# Patient Record
Sex: MALE | Race: BLACK OR AFRICAN AMERICAN | Employment: FULL TIME | URBAN - METROPOLITAN AREA
[De-identification: names, ages, dates, MRNs, and addresses within clinical notes are randomized per-mention and may not be internally consistent; named-entity substitution may affect disease eponyms.]

---

## 2023-08-09 ENCOUNTER — HOSPITAL ENCOUNTER (EMERGENCY)
Age: 28
Discharge: HOME | End: 2023-08-09
Payer: OTHER GOVERNMENT

## 2023-08-09 ENCOUNTER — APPOINTMENT (OUTPATIENT)
Dept: GENERAL RADIOLOGY | Age: 28
End: 2023-08-09
Payer: OTHER GOVERNMENT

## 2023-08-09 VITALS
HEIGHT: 67 IN | RESPIRATION RATE: 18 BRPM | HEART RATE: 61 BPM | SYSTOLIC BLOOD PRESSURE: 126 MMHG | OXYGEN SATURATION: 99 % | TEMPERATURE: 98.7 F | BODY MASS INDEX: 29.03 KG/M2 | WEIGHT: 185 LBS | DIASTOLIC BLOOD PRESSURE: 93 MMHG

## 2023-08-09 DIAGNOSIS — R20.2 PARESTHESIA OF LEFT UPPER AND LOWER EXTREMITY: ICD-10-CM

## 2023-08-09 DIAGNOSIS — R07.89 CHEST DISCOMFORT: Primary | ICD-10-CM

## 2023-08-09 LAB
ALBUMIN SERPL-MCNC: 4.4 G/DL (ref 3.4–5)
ALBUMIN/GLOB SERPL: 1.2 {RATIO} (ref 1.1–2.2)
ALP SERPL-CCNC: 93 U/L (ref 40–129)
ALT SERPL-CCNC: 33 U/L (ref 10–40)
ANION GAP SERPL CALCULATED.3IONS-SCNC: 7 MMOL/L (ref 3–16)
AST SERPL-CCNC: 30 U/L (ref 15–37)
BASOPHILS # BLD: 0 K/UL (ref 0–0.2)
BASOPHILS NFR BLD: 0.3 %
BILIRUB SERPL-MCNC: 0.3 MG/DL (ref 0–1)
BUN SERPL-MCNC: 12 MG/DL (ref 7–20)
CALCIUM SERPL-MCNC: 9.7 MG/DL (ref 8.3–10.6)
CHLORIDE SERPL-SCNC: 100 MMOL/L (ref 99–110)
CO2 SERPL-SCNC: 28 MMOL/L (ref 21–32)
CREAT SERPL-MCNC: 1.2 MG/DL (ref 0.9–1.3)
DEPRECATED RDW RBC AUTO: 12.8 % (ref 12.4–15.4)
EKG ATRIAL RATE: 63 BPM
EKG DIAGNOSIS: NORMAL
EKG P AXIS: 72 DEGREES
EKG P-R INTERVAL: 174 MS
EKG Q-T INTERVAL: 374 MS
EKG QRS DURATION: 76 MS
EKG QTC CALCULATION (BAZETT): 382 MS
EKG R AXIS: 36 DEGREES
EKG T AXIS: 30 DEGREES
EKG VENTRICULAR RATE: 63 BPM
EOSINOPHIL # BLD: 0 K/UL (ref 0–0.6)
EOSINOPHIL NFR BLD: 1.1 %
GFR SERPLBLD CREATININE-BSD FMLA CKD-EPI: >60 ML/MIN/{1.73_M2}
GLUCOSE SERPL-MCNC: 109 MG/DL (ref 70–99)
HCT VFR BLD AUTO: 43.7 % (ref 40.5–52.5)
HGB BLD-MCNC: 14.5 G/DL (ref 13.5–17.5)
LYMPHOCYTES # BLD: 2 K/UL (ref 1–5.1)
LYMPHOCYTES NFR BLD: 52.1 %
MCH RBC QN AUTO: 27.4 PG (ref 26–34)
MCHC RBC AUTO-ENTMCNC: 33.3 G/DL (ref 31–36)
MCV RBC AUTO: 82.3 FL (ref 80–100)
MONOCYTES # BLD: 0.4 K/UL (ref 0–1.3)
MONOCYTES NFR BLD: 11.6 %
NEUTROPHILS # BLD: 1.3 K/UL (ref 1.7–7.7)
NEUTROPHILS NFR BLD: 34.9 %
PLATELET # BLD AUTO: 171 K/UL (ref 135–450)
PMV BLD AUTO: 8.8 FL (ref 5–10.5)
POTASSIUM SERPL-SCNC: 4.1 MMOL/L (ref 3.5–5.1)
PROT SERPL-MCNC: 8.1 G/DL (ref 6.4–8.2)
RBC # BLD AUTO: 5.31 M/UL (ref 4.2–5.9)
SODIUM SERPL-SCNC: 135 MMOL/L (ref 136–145)
SPECIMEN STATUS: NORMAL
TROPONIN, HIGH SENSITIVITY: <6 NG/L (ref 0–22)
TROPONIN, HIGH SENSITIVITY: <6 NG/L (ref 0–22)
WBC # BLD AUTO: 3.8 K/UL (ref 4–11)

## 2023-08-09 PROCEDURE — 71045 X-RAY EXAM CHEST 1 VIEW: CPT

## 2023-08-09 PROCEDURE — 99285 EMERGENCY DEPT VISIT HI MDM: CPT

## 2023-08-09 PROCEDURE — 80053 COMPREHEN METABOLIC PANEL: CPT

## 2023-08-09 PROCEDURE — 93005 ELECTROCARDIOGRAM TRACING: CPT | Performed by: EMERGENCY MEDICINE

## 2023-08-09 PROCEDURE — 84484 ASSAY OF TROPONIN QUANT: CPT

## 2023-08-09 PROCEDURE — 85025 COMPLETE CBC W/AUTO DIFF WBC: CPT

## 2023-08-09 PROCEDURE — 93010 ELECTROCARDIOGRAM REPORT: CPT | Performed by: INTERNAL MEDICINE

## 2023-08-09 ASSESSMENT — HEART SCORE: ECG: 0

## 2023-08-09 ASSESSMENT — PAIN DESCRIPTION - PAIN TYPE: TYPE: ACUTE PAIN

## 2023-08-09 ASSESSMENT — PAIN DESCRIPTION - ONSET: ONSET: GRADUAL

## 2023-08-09 ASSESSMENT — PAIN SCALES - GENERAL: PAINLEVEL_OUTOF10: 5

## 2023-08-09 ASSESSMENT — PAIN - FUNCTIONAL ASSESSMENT: PAIN_FUNCTIONAL_ASSESSMENT: 0-10

## 2023-08-09 ASSESSMENT — PAIN DESCRIPTION - LOCATION: LOCATION: CHEST

## 2023-08-09 ASSESSMENT — PAIN DESCRIPTION - FREQUENCY: FREQUENCY: CONTINUOUS

## 2023-08-09 NOTE — ED PROVIDER NOTES
[facility]  ED  EMERGENCY DEPARTMENT ENCOUNTER        Pt Name: Tabatha Fernandes  MRN: [id number]  [address] 1995  Date of evaluation: 8/9/2023  Provider: David Adkins PA-C  PCP: No primary care provider on file. ED Attending: Ida AUGUSTE. I have evaluated this patient. CHIEF COMPLAINT:     Chief Complaint   Patient presents with    Chest Pain     Pt states chest pain that started last night. Pt states \"I think maybe it was a stroke\"       HISTORY OF PRESENT ILLNESS:      History provided by the patient. No limitations. Tabatha Fernandes is a 25 y.o. male who arrives to the ED by private vehicle. This patient arrives to the emergency department describing chest pain that been going on intermittently for about 3 weeks. He states even prior to experiencing the episodes of chest pain he was experiencing some discomfort to his left neck/upper back and of his left arm. He reports an intermittent feeling of numbness/tingling to his left side. These episodes never last more than a couple of minutes before he is able to \"shake them off\". He states he started bodybuilding 3 to 4 months ago and seems to correlate the onset of his symptoms with when he really started to get into bodybuilding. Some specific episodes-- he had an episode about a week ago while he was driving where he had substernal chest pain and reports the left side of his body also felt painful and tingly. He was able to Rachelfort it off\" and returned to normal pretty quickly. He states last night while he was running around in his home watching TV he again started having substernal chest pain with radiation to the left arm and tingling in his left arm. He tells the triage nurse that he thinks it something neurologic, he \"thinks it might be a stroke\". Upon arrival, he still has some mild chest discomfort. Denies any feeling of weakness or numbness anywhere. Denies any personal past medical history.   Denies any

## 2023-08-09 NOTE — ED NOTES
Pt discharged with discharge paperwork. Pt verbalized understanding of education. Pt aware of new doctor shannon. Pt informed to return to ED for worsening symptoms. Pt ambulated out of ED with no signs of distress. No additional needs or concerns.        Carolyn Herrera RN  08/09/23 1087

## 2023-08-13 SDOH — HEALTH STABILITY: PHYSICAL HEALTH: ON AVERAGE, HOW MANY MINUTES DO YOU ENGAGE IN EXERCISE AT THIS LEVEL?: 40 MIN

## 2023-08-13 ASSESSMENT — SOCIAL DETERMINANTS OF HEALTH (SDOH)

## 2023-08-14 ENCOUNTER — OFFICE VISIT (OUTPATIENT)
Dept: PRIMARY CARE CLINIC | Age: 28
End: 2023-08-14

## 2023-08-14 VITALS
TEMPERATURE: 97.4 F | OXYGEN SATURATION: 97 % | DIASTOLIC BLOOD PRESSURE: 78 MMHG | RESPIRATION RATE: 18 BRPM | WEIGHT: 198.6 LBS | SYSTOLIC BLOOD PRESSURE: 138 MMHG | HEIGHT: 67 IN | BODY MASS INDEX: 31.17 KG/M2 | HEART RATE: 81 BPM

## 2023-08-14 DIAGNOSIS — R20.2 ARM PARESTHESIA, LEFT: ICD-10-CM

## 2023-08-14 DIAGNOSIS — Z00.00 HEALTHCARE MAINTENANCE: ICD-10-CM

## 2023-08-14 DIAGNOSIS — R07.9 CHEST PAIN, UNSPECIFIED TYPE: Primary | ICD-10-CM

## 2023-08-14 DIAGNOSIS — E66.9 CLASS 1 OBESITY WITHOUT SERIOUS COMORBIDITY WITH BODY MASS INDEX (BMI) OF 31.0 TO 31.9 IN ADULT, UNSPECIFIED OBESITY TYPE: ICD-10-CM

## 2023-08-14 DIAGNOSIS — M54.2 NECK PAIN: ICD-10-CM

## 2023-08-14 DIAGNOSIS — R22.1 NECK MASS: ICD-10-CM

## 2023-08-14 RX ORDER — MELOXICAM 7.5 MG/1
7.5 TABLET ORAL DAILY
Qty: 30 TABLET | Refills: 3 | Status: SHIPPED | OUTPATIENT
Start: 2023-08-14

## 2023-08-14 SDOH — ECONOMIC STABILITY: FOOD INSECURITY: WITHIN THE PAST 12 MONTHS, THE FOOD YOU BOUGHT JUST DIDN'T LAST AND YOU DIDN'T HAVE MONEY TO GET MORE.: NEVER TRUE

## 2023-08-14 SDOH — ECONOMIC STABILITY: FOOD INSECURITY: WITHIN THE PAST 12 MONTHS, YOU WORRIED THAT YOUR FOOD WOULD RUN OUT BEFORE YOU GOT MONEY TO BUY MORE.: NEVER TRUE

## 2023-08-14 SDOH — ECONOMIC STABILITY: HOUSING INSECURITY
IN THE LAST 12 MONTHS, WAS THERE A TIME WHEN YOU DID NOT HAVE A STEADY PLACE TO SLEEP OR SLEPT IN A SHELTER (INCLUDING NOW)?: NO

## 2023-08-14 SDOH — ECONOMIC STABILITY: INCOME INSECURITY: HOW HARD IS IT FOR YOU TO PAY FOR THE VERY BASICS LIKE FOOD, HOUSING, MEDICAL CARE, AND HEATING?: NOT VERY HARD

## 2023-08-14 ASSESSMENT — ENCOUNTER SYMPTOMS
SHORTNESS OF BREATH: 0
CONSTIPATION: 0
COUGH: 0
VOMITING: 0
SORE THROAT: 0
BACK PAIN: 1
DIARRHEA: 0
RHINORRHEA: 0
NAUSEA: 0
CHEST TIGHTNESS: 0
ABDOMINAL PAIN: 0

## 2023-08-14 ASSESSMENT — PATIENT HEALTH QUESTIONNAIRE - PHQ9
8. MOVING OR SPEAKING SO SLOWLY THAT OTHER PEOPLE COULD HAVE NOTICED. OR THE OPPOSITE, BEING SO FIGETY OR RESTLESS THAT YOU HAVE BEEN MOVING AROUND A LOT MORE THAN USUAL: 0
SUM OF ALL RESPONSES TO PHQ QUESTIONS 1-9: 0
9. THOUGHTS THAT YOU WOULD BE BETTER OFF DEAD, OR OF HURTING YOURSELF: 0
4. FEELING TIRED OR HAVING LITTLE ENERGY: 0
SUM OF ALL RESPONSES TO PHQ9 QUESTIONS 1 & 2: 0
SUM OF ALL RESPONSES TO PHQ QUESTIONS 1-9: 0
SUM OF ALL RESPONSES TO PHQ QUESTIONS 1-9: 0
7. TROUBLE CONCENTRATING ON THINGS, SUCH AS READING THE NEWSPAPER OR WATCHING TELEVISION: 0
6. FEELING BAD ABOUT YOURSELF - OR THAT YOU ARE A FAILURE OR HAVE LET YOURSELF OR YOUR FAMILY DOWN: 0
5. POOR APPETITE OR OVEREATING: 0
3. TROUBLE FALLING OR STAYING ASLEEP: 0
SUM OF ALL RESPONSES TO PHQ QUESTIONS 1-9: 0
1. LITTLE INTEREST OR PLEASURE IN DOING THINGS: 0
2. FEELING DOWN, DEPRESSED OR HOPELESS: 0
DEPRESSION UNABLE TO ASSESS: FUNCTIONAL CAPACITY MOTIVATION LIMITS ACCURACY

## 2023-08-14 ASSESSMENT — ANXIETY QUESTIONNAIRES
3. WORRYING TOO MUCH ABOUT DIFFERENT THINGS: 0
4. TROUBLE RELAXING: 0
GAD7 TOTAL SCORE: 0
6. BECOMING EASILY ANNOYED OR IRRITABLE: 0
7. FEELING AFRAID AS IF SOMETHING AWFUL MIGHT HAPPEN: 0
1. FEELING NERVOUS, ANXIOUS, OR ON EDGE: 0
5. BEING SO RESTLESS THAT IT IS HARD TO SIT STILL: 0
2. NOT BEING ABLE TO STOP OR CONTROL WORRYING: 0

## 2023-08-15 ASSESSMENT — ENCOUNTER SYMPTOMS
NAUSEA: 0
SHORTNESS OF BREATH: 0
ABDOMINAL PAIN: 0
BACK PAIN: 1
DIARRHEA: 0
CONSTIPATION: 0
COUGH: 0
SORE THROAT: 0
VOMITING: 0
RHINORRHEA: 0
CHEST TIGHTNESS: 0

## 2023-08-16 ENCOUNTER — TELEPHONE (OUTPATIENT)
Dept: PRIMARY CARE CLINIC | Age: 28
End: 2023-08-16

## 2023-08-16 NOTE — PROGRESS NOTES
[address] [facility] Medicine Residency Practice                                  [address] [facility], [address], [address] [address]         Phone: [phone]    Date of Service:  8/14/2023     Patient ID: Shawna Odom     Subjective:     CC: New patient establishing care. Emergency Department Follow up     HPI  Mr. Tamia Hernandez is a 25year old male with no significant PMH, presenting to establish care after he was evaluated in the ED on 8/9/23 for chest pain. He has no known allergies to food, drugs, or the environment. He has no surgical history. He reports family history of DM2 in his mother and his father (living) had a stroke in his 42's. The patient is in the [employer], and he works as an [employer] . He spends most of his work day typing on a computer. He exercises 3-4 times weekly, and his work outs include running, bench press, and deadlifting. 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     Chest Pain/Left Arm Paraesthesia  Patient states the chest pain began 1 month ago when he began lifting weights more intensely. The pain occurs in his left neck, shoulder, and chest, and sometimes radiates to his left leg. He describes it as a burning and \"biting\" pain. He also endorses tingling, but no numbness, and no weakness. It is triggered by especially intense weight lifting. It occurs 2-3 times weekly, and lasts for 3-5 minutes and happens routinely after patient completes his workout. He has taken aspirin with good symptomatic relief. He has tried no other treatments. 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 Has had neck mass that fluctuates in size, but has been getting progressively larger over the course of a number of years. Mass is painless but measures about 3 cm in diameter.          ROS:  Review of Systems
[address] [facility] and [facility] Medicine Residency Practice                                  [address] [facility], [address], [address] [address]         Phone: [phone]    Date of Service:  8/14/2023     Patient ID: . Juni Mitchell is a 25 y.o. male      Subjective:     CC: New patient establishing care. Emergency Department Follow up     HPI  Mr. Allie Loving is a 25year old male with no significant PMH, presenting to establish care after he was evaluated in the ED on 8/9/23 for chest pain. He has no known allergies to food, drugs, or the environment. He has no surgical history. He reports family history of DM2 in his mother and his father had a stroke in his 42's. 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 He exercises 3-4 times weekly, and his work outs include running, bench press, and deadlifting. 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     Chest Pain  Patient states the chest pain began 1 month ago when he began lifting weights more intensely. The pain occurs in his left neck, shoulder, and chest, and sometimes radiates to his left leg. He describes it as a burning and \"biting\" pain. He also endorses tingling, but no numbness, and no weakness. It is triggered by especially intense weight lifting. It occurs 2-3 times weekly, and lasts for 3-5 minutes. He has taken aspirin with relief. He has tried no other treatments. ROS:    Review of Systems   Constitutional:  Negative for chills, fatigue and fever. HENT:  Negative for congestion, rhinorrhea and sore throat. Eyes:  Negative for visual disturbance. Respiratory:  Negative for cough, chest tightness and shortness of breath. Cardiovascular:  Positive for chest pain. Negative for palpitations. Gastrointestinal:  Negative for abdominal pain, constipation, diarrhea, nausea and vomiting.
A - 0   Pmhx - 0   Meds - 0   Surg - 0     Mom - DM  Dad - Stroke      - Exercises 3x-4x per week     Social - 0       Pain in left arm, left leg, right arm, headache   Due to recent increase in exercise   More persistent   Parasthesias no numbness   Taken ASA in past with good symptomatic relief       PE -   No TTP over chest wall
on risks/benefits/side effects. Patient understood, asked questions when medically appropriate and agreed to proceed with this plan. Advised to take medication with food. Order cervical spine radiographs given chronicity of pain without improvement of conservative management. Rule out underlying stress fracture. Given no prior Lipid Panel and with positive risk factors (family history), will order Cardiac Stress Test today. Will have our office call and let patient know to obtain. [place] Score of 0.3% and ASCVD Risk 3.5% (assuming age of 36 and normal Lipid Panel). RTC in 4 weeks to re-evaluate. If no improvement in symptoms, consider EMG (to rule out underlying cervical radiculopathy). Neck Mass/Neck Pain   Not controlled, not at goal.   Ddx likely Lipoma vs. Dermoid Inclusion Cyst  Will refer patient to General Surgery as he prefers to have it surgically removed now. RTC for pre-op if indicated. Obesity, BMI 31-32  Controlled, at goal.   Patient already has good dietary/lifestyle measures. Remains active. Order TSH, Lipid Panel, and Hemoglobin A1c as this has not been done before. RTC in 4 weeks to f/u on results. Healthcare Maintenance   Consider ordering HIV/Hep C Screen at next visit as patient may not have completed before. EMR Dragon/transcription disclaimer:  Much of this encounter note is electronic transcription/translation of spoken language to printed texts. The electronic translation of spoken language may be erroneous, or at times, nonsensical words or phrases may be inadvertently transcribed.   Although I have reviewed the note for such errors, some may still exist.     Sara Roche,   PGY-2, Family Medicine  [facility]
none

## 2023-08-16 NOTE — TELEPHONE ENCOUNTER
----- Message from Anirudh Nicholson DO sent at 8/15/2023  8:43 PM EDT -----  Regarding: Stress Test  Please call and advise patient to obtain Cardiac Stress Test. He will need to call Central Scheduling to schedule. Please provide him with the number for this. I spoke with the Attending this evening and we agreed that while the patient has a relatively low risk for ACS given prior workup in ED, he has no prior Lipid Panel on file and has a positive family history of Stroke in a first degree relative under the age of 48. As such, I made an addendum to my note and added that the patient obtain a Stress Test prior to his next visit in 1 month. I will make an attempt to call and explain to the patient tomorrow too. Thank you.

## 2023-08-16 NOTE — TELEPHONE ENCOUNTER
Call placed to this patient, information relayed per Dr. Pretty Headings. Patient voices understanding. Per patient request, number to Central Scheduling sent to him via [address] [place] [address].

## 2023-08-17 ENCOUNTER — HOSPITAL ENCOUNTER (OUTPATIENT)
Age: 28
Discharge: HOME | End: 2023-08-17
Payer: OTHER GOVERNMENT

## 2023-08-17 DIAGNOSIS — E66.9 CLASS 1 OBESITY WITHOUT SERIOUS COMORBIDITY WITH BODY MASS INDEX (BMI) OF 31.0 TO 31.9 IN ADULT, UNSPECIFIED OBESITY TYPE: ICD-10-CM

## 2023-08-17 DIAGNOSIS — R07.9 CHEST PAIN, UNSPECIFIED TYPE: ICD-10-CM

## 2023-08-17 LAB
CHOLEST SERPL-MCNC: 223 MG/DL (ref 0–199)
HDLC SERPL-MCNC: 49 MG/DL (ref 40–60)
LDLC SERPL CALC-MCNC: 160 MG/DL
TRIGL SERPL-MCNC: 68 MG/DL (ref 0–150)
TSH SERPL DL<=0.005 MIU/L-ACNC: 1.13 UIU/ML (ref 0.27–4.2)
VLDLC SERPL CALC-MCNC: 14 MG/DL

## 2023-08-17 PROCEDURE — 84443 ASSAY THYROID STIM HORMONE: CPT

## 2023-08-17 PROCEDURE — 80061 LIPID PANEL: CPT

## 2023-08-17 PROCEDURE — 36415 COLL VENOUS BLD VENIPUNCTURE: CPT

## 2023-08-17 PROCEDURE — 83036 HEMOGLOBIN GLYCOSYLATED A1C: CPT

## 2023-08-18 LAB
EST. AVERAGE GLUCOSE BLD GHB EST-MCNC: 119.8 MG/DL
HBA1C MFR BLD: 5.8 %

## 2023-08-25 ENCOUNTER — INITIAL CONSULT (OUTPATIENT)
Dept: SURGERY | Age: 28
End: 2023-08-25
Payer: OTHER GOVERNMENT

## 2023-08-25 VITALS
BODY MASS INDEX: 30.83 KG/M2 | HEIGHT: 67 IN | HEART RATE: 64 BPM | DIASTOLIC BLOOD PRESSURE: 82 MMHG | WEIGHT: 196.4 LBS | SYSTOLIC BLOOD PRESSURE: 122 MMHG

## 2023-08-25 DIAGNOSIS — L72.3 SEBACEOUS CYST: Primary | ICD-10-CM

## 2023-08-25 PROCEDURE — 99203 OFFICE O/P NEW LOW 30 MIN: CPT | Performed by: SURGERY

## 2023-08-26 NOTE — PATIENT INSTRUCTIONS
[address] [facility],[address]  Phone: [phone]  Fax: [phone] will be scheduled for surgery with Dr. Cyril Lunsford. The office will call you with the date and time that surgery is scheduled. Please take note of these instructions for surgery: You should have nothing by mouth after midnight the night before your surgery - this includes no food or water. Your surgery will be cancelled if you have taken anything by mouth after midnight, NO exceptions. You will need to have a history and physical prior to your surgery. This will need to be completed up to 30 days before your surgery. This H/P can be completed by your family doctor or the hospital.   IF you take coumadin (warfarin), please stop taking this medication 5 days prior to your surgery. IF you take plavix, please stop taking this 7 days prior to your surgery. Please contact our office if you have a pacemaker or defibrillator. IF you are allergic to latex, please tell our office prior to your surgery. This is important in know before scheduling your surgery. IF you are having an out patient surgery, you will need someone available to drive you home after your surgery, and to also stay with you for the rest of the day. IF you are having a surgery requiring an inpatient stay in the hospital, you will need someone to drive you home upon discharge from the hospital.  Please contact Dr. Katya Bobo assistant Karyle Blocker if you have any questions or concerns. Please call the office with any changes in your symptoms or further questions/concerns.  [phone]

## 2023-08-28 ENCOUNTER — TELEPHONE (OUTPATIENT)
Dept: SURGERY | Age: 28
End: 2023-08-28

## 2023-08-28 NOTE — TELEPHONE ENCOUNTER
Pt saw Dr Elier Hodge in the office 8/25/23 and was given surgery instructions for an Upper Back Cyst Excision (Local Anes) scheduled at the [facility] on 9/15/23 @ 8:15am arrival 7:15am - Dr Elier Hodge will do pts pre-op physical - Pt understood and agreed w/ above noted

## 2023-09-07 ENCOUNTER — HOSPITAL ENCOUNTER (OUTPATIENT)
Dept: CARDIOLOGY | Age: 28
Discharge: HOME | End: 2023-09-07
Payer: OTHER GOVERNMENT

## 2023-09-07 DIAGNOSIS — R20.2 ARM PARESTHESIA, LEFT: ICD-10-CM

## 2023-09-07 DIAGNOSIS — R07.9 CHEST PAIN, UNSPECIFIED TYPE: ICD-10-CM

## 2023-09-07 PROCEDURE — 3430000000 HC RX DIAGNOSTIC RADIOPHARMACEUTICAL: Performed by: STUDENT IN AN ORGANIZED HEALTH CARE EDUCATION/TRAINING PROGRAM

## 2023-09-07 PROCEDURE — 78452 HT MUSCLE IMAGE SPECT MULT: CPT

## 2023-09-07 PROCEDURE — 93017 CV STRESS TEST TRACING ONLY: CPT

## 2023-09-07 PROCEDURE — A9502 TC99M TETROFOSMIN: HCPCS | Performed by: STUDENT IN AN ORGANIZED HEALTH CARE EDUCATION/TRAINING PROGRAM

## 2023-09-07 RX ADMIN — TETROFOSMIN 32 MILLICURIE: 1.38 INJECTION, POWDER, LYOPHILIZED, FOR SOLUTION INTRAVENOUS at 10:01

## 2023-09-07 RX ADMIN — TETROFOSMIN 10.8 MILLICURIE: 1.38 INJECTION, POWDER, LYOPHILIZED, FOR SOLUTION INTRAVENOUS at 08:25

## 2023-09-07 NOTE — PROGRESS NOTES
Erika Rahman    Age 25 y.o.    male    1995    MRN [id number]    9/15/2023  Arrival Time_____________  OR Time____________[address]     Procedure(s):  UPPER BACK CYST EXCISION                      General    Surgeon(s):  Ruby Parry, MD       Phone [phone] ([place])     [facility]  Cell         Work  _____________________________________________________________________  _____________________________________________________________________  _____________________________________________________________________  _____________________________________________________________________  _____________________________________________________________________    PCP _____________________________ Phone_________________     H&P__________________Bringing      Chart            Epic   DOS      Called________  EKG__________________Bringing      Chart            Epic   DOS      Called________  LAB__________________ Bringing      Chart            Epic   DOS      Called________  Cardiac Clearance_______Bringing      Chart            Epic      DOS      Called________    Cardiologist________________________ Phone___________________________    ? Taoism concerns / Waiver on Chart            [employer]________________  ? Pre-op Instructions Given [address]          _________________________________  ? Directions to Surgery Center                          _________________________________  ? Transportation Home_______________      __________________________________  ?  Crutches/Walker__________________        __________________________________    ________Pre-op Orders   _______Transcribed    _______Wt.  ________Pharmacy          _______SCD  ______VTE     ______TED [employer]  _______  Surgery Consent    _______  Anesthesia Consent         COVID DATE______________LOCATION________________ RESULT__________

## 2023-09-14 ENCOUNTER — OFFICE VISIT (OUTPATIENT)
Dept: PRIMARY CARE CLINIC | Age: 28
End: 2023-09-14

## 2023-09-14 VITALS
HEIGHT: 67 IN | BODY MASS INDEX: 30.76 KG/M2 | OXYGEN SATURATION: 95 % | TEMPERATURE: 98.2 F | WEIGHT: 196 LBS | DIASTOLIC BLOOD PRESSURE: 72 MMHG | SYSTOLIC BLOOD PRESSURE: 134 MMHG | HEART RATE: 66 BPM

## 2023-09-14 DIAGNOSIS — L72.3 SEBACEOUS CYST: ICD-10-CM

## 2023-09-14 DIAGNOSIS — R73.03 PREDIABETES: ICD-10-CM

## 2023-09-14 DIAGNOSIS — Z87.898 HISTORY OF PARESTHESIA: Primary | ICD-10-CM

## 2023-09-14 DIAGNOSIS — E78.00 PURE HYPERCHOLESTEROLEMIA: ICD-10-CM

## 2023-09-14 ASSESSMENT — ENCOUNTER SYMPTOMS
DIARRHEA: 0
ABDOMINAL PAIN: 0
SORE THROAT: 0
NAUSEA: 0
BACK PAIN: 1
VOMITING: 0
COUGH: 0
BACK PAIN: 0
COLOR CHANGE: 0
CONSTIPATION: 0
WHEEZING: 0
CHEST TIGHTNESS: 0
RHINORRHEA: 0
SHORTNESS OF BREATH: 0

## 2023-09-15 ENCOUNTER — TELEPHONE (OUTPATIENT)
Dept: SURGERY | Age: 28
End: 2023-09-15

## 2023-09-15 ENCOUNTER — HOSPITAL ENCOUNTER (OUTPATIENT)
Age: 28
Setting detail: OUTPATIENT SURGERY
Discharge: HOME | End: 2023-09-15
Attending: SURGERY | Admitting: SURGERY
Payer: OTHER GOVERNMENT

## 2023-09-15 VITALS
RESPIRATION RATE: 16 BRPM | HEIGHT: 67 IN | DIASTOLIC BLOOD PRESSURE: 78 MMHG | WEIGHT: 185 LBS | TEMPERATURE: 97.8 F | OXYGEN SATURATION: 97 % | SYSTOLIC BLOOD PRESSURE: 113 MMHG | HEART RATE: 65 BPM | BODY MASS INDEX: 29.03 KG/M2

## 2023-09-15 DIAGNOSIS — G89.18 POST-OP PAIN: Primary | ICD-10-CM

## 2023-09-15 DIAGNOSIS — L72.3 SEBACEOUS CYST: ICD-10-CM

## 2023-09-15 PROCEDURE — 7100000010 HC PHASE II RECOVERY - FIRST 15 MIN: Performed by: SURGERY

## 2023-09-15 PROCEDURE — 6360000002 HC RX W HCPCS: Performed by: SURGERY

## 2023-09-15 PROCEDURE — 3600000014 HC SURGERY LEVEL 4 ADDTL 15MIN: Performed by: SURGERY

## 2023-09-15 PROCEDURE — 7100000011 HC PHASE II RECOVERY - ADDTL 15 MIN: Performed by: SURGERY

## 2023-09-15 PROCEDURE — 3600000004 HC SURGERY LEVEL 4 BASE: Performed by: SURGERY

## 2023-09-15 PROCEDURE — 21933 EXC BACK TUM DEEP 5 CM/>: CPT | Performed by: SURGERY

## 2023-09-15 PROCEDURE — 2500000003 HC RX 250 WO HCPCS: Performed by: SURGERY

## 2023-09-15 PROCEDURE — 88304 TISSUE EXAM BY PATHOLOGIST: CPT

## 2023-09-15 PROCEDURE — 2709999900 HC NON-CHARGEABLE SUPPLY: Performed by: SURGERY

## 2023-09-15 RX ORDER — OXYCODONE HYDROCHLORIDE AND ACETAMINOPHEN 5; 325 MG/1; MG/1
1 TABLET ORAL EVERY 4 HOURS PRN
Qty: 10 TABLET | Refills: 0 | Status: SHIPPED | OUTPATIENT
Start: 2023-09-15 | End: 2023-09-18

## 2023-09-15 ASSESSMENT — PAIN - FUNCTIONAL ASSESSMENT: PAIN_FUNCTIONAL_ASSESSMENT: 0-10

## 2023-09-15 ASSESSMENT — PAIN SCALES - GENERAL: PAINLEVEL_OUTOF10: 0

## 2023-09-15 NOTE — PROGRESS NOTES
[address] [facility] and [facility] Medicine Residency Practice                                  [address] [facility], [address], [address] [address]         Phone: [phone]    Date of Service:  9/14/2023     Patient ID: Shawna Chapin     Subjective:     CC: New patient establishing care. Emergency Department Follow up     HPI  Mr. Elvis Warner is a 25year old male with no significant PMH, presenting to establish care after he was evaluated in the ED on 8/9/23 for chest pain. He has no known allergies to food, drugs, or the environment. He has no surgical history. He reports family history of DM2 in his mother and his father (living) had a stroke in his 42's. The patient is in the [employer], and he works as an [employer] . He spends most of his work day typing on a computer. He exercises 3-4 times weekly, and his work outs include running, bench press, and deadlifting. 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       Left Arm Paraesthesia        Patient states the chest pain began 1 month ago when he began lifting weights more intensely. The pain occurs in his left neck, shoulder, and chest, and sometimes radiates to his left leg. He describes it as a burning and \"biting\" pain. He also endorses tingling, but no numbness, and no weakness. It is triggered by especially intense weight lifting. It occurs 2-3 times weekly, and lasts for 3-5 minutes and happens routinely after patient completes his workout. He has taken aspirin with good symptomatic relief. He has tried no other treatments. 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 Has had neck mass that fluctuates in size, but has been getting progressively larger over the course of a number of years. Mass is painless but measures about 3 cm in diameter.      Hyperlipidemia/Prediabetes
27.4     MCHC 08/09/2023 33.3     RDW 08/09/2023 12.8     Platelets 24/97/8582 171     MPV 08/09/2023 8.8     Neutrophils % 08/09/2023 34.9     Lymphocytes % 08/09/2023 52.1     Monocytes % 08/09/2023 11.6     Eosinophils % 08/09/2023 1.1     Basophils % 08/09/2023 0.3     Neutrophils Absolute 08/09/2023 1.3 (L)     Lymphocytes Absolute 08/09/2023 2.0     Monocytes Absolute 08/09/2023 0.4     Eosinophils Absolute 08/09/2023 0.0     Basophils Absolute 08/09/2023 0.0     Sodium 08/09/2023 135 (L)     Potassium reflex Magnesi* 08/09/2023 4.1     Chloride 08/09/2023 100     CO2 08/09/2023 28     Anion Gap 08/09/2023 7     Glucose 08/09/2023 109 (H)     BUN 08/09/2023 12     Creatinine 08/09/2023 1.2     Est, Glom Filt Rate 08/09/2023 >60     Calcium 08/09/2023 9.7     Total Protein 08/09/2023 8.1     Albumin 08/09/2023 4.4     Albumin/Globulin Ratio 08/09/2023 1.2     Total Bilirubin 08/09/2023 0.3     Alkaline Phosphatase 08/09/2023 93     ALT 08/09/2023 33     AST 08/09/2023 30     Troponin, High Sensitivi* 08/09/2023 <6     Troponin, High Sensitivi* 08/09/2023 <6     Specimen Status 08/09/2023 KEN           Assessment/Plan:  Patient is a 57-year-old male with past medical history of hyperlipidemia and prediabetes. He presents in office today for chronic care follow-up on recent labs and left arm pain/numbness/tingling. Left Arm Paresthesias   Resolved. Likely MSK in nature given resolution with anti-inflammatory. Could have considered muscle strain from prior workout. We will discontinue Mobic 7.5 mg daily at this time. Can continue to manage with OTC as needed Tylenol or ibuprofen for symptomatic pain relief. Stress test findings unremarkable. Patient has yet to complete C-spine radiographs. RTC as needed or sooner if symptoms return or do not improve.      Neck Mass/Neck Pain   Controlled, however not at goal.   General surgery has evaluated the patient and likely thought to believe to be secondary

## 2023-09-15 NOTE — TELEPHONE ENCOUNTER
Pt called and said he had upper back cyst excision today 9/15 by Dr. Lj Lebron. He wants to know if he is allowed to changed the dressing due to it getting bloody? He made PO appt for 2 wks from today. Please call him and thank you!

## 2023-09-15 NOTE — BRIEF OP NOTE
Brief Postoperative Note      Patient: Max Guzmán  YOB: 1995  MRN: [id number]    Date of Procedure: 9/15/2023    Pre-Op Diagnosis Codes:     * Sebaceous cyst [L72.3]    Post-Op Diagnosis:  Deep lipoma, upper back       Procedure(s):  UPPER BACK CYST EXCISION    Surgeon(s):  Esme Hernadez MD    Assistant:  Surgical Assistant: Nadira Mireles    Anesthesia: Local    Estimated Blood Loss (mL): less than 50     Complications: None    Specimens:   ID Type Source Tests Collected by Time Destination   A : UPPER BACK LIPOMA Specimen Cyst SURGICAL PATHOLOGY Esme Hernadez MD 9/15/2023 3971        Implants:  * No implants in log *      Drains: * No LDAs found *    Findings: deep, subfascial lipoma, ~6x7cm, fully removed    This procedure was not performed to treat primary cutaneous melanoma through wide local excision    Job#: [id number]      Electronically signed by Esme Hernadez MD on 9/15/2023 at 1:38 PM

## 2023-09-18 ENCOUNTER — TELEPHONE (OUTPATIENT)
Dept: SURGERY | Age: 28
End: 2023-09-18

## 2023-09-18 NOTE — TELEPHONE ENCOUNTER
Pt called and said he had an upper back cyst excision 9/15 by Dr. Zuly Salazar. He first wants to know if he can take the gauze off and leave off? He doesn't have anybody around to put on new gauze and it's saturated and he doesn't want to leave it on. Secondly, he wants to know if the sutures are dissolvable or if he needs to come in at some point to have them taken out? He is hoping to hear from you today. Please call him and thank you!

## 2023-09-28 ENCOUNTER — TELEPHONE (OUTPATIENT)
Dept: PRIMARY CARE CLINIC | Age: 28
End: 2023-09-28

## 2023-09-28 NOTE — TELEPHONE ENCOUNTER
Patient is calling and requesting to talk to Dr. Jerrica Taylor.  Patient is stating that he just has some concerns that he would like to discuss, Patient would not say what was going on he only wants to talk to the

## 2023-09-28 NOTE — TELEPHONE ENCOUNTER
Patient is calling and requesting to talk to Dr. Kandice Brewster.  Patient is stating that he just has some concerns that he would like to discuss, Patient would not say what was going on he only wants to talk to the

## 2023-09-29 ENCOUNTER — OFFICE VISIT (OUTPATIENT)
Dept: SURGERY | Age: 28
End: 2023-09-29

## 2023-09-29 VITALS
DIASTOLIC BLOOD PRESSURE: 70 MMHG | HEIGHT: 67 IN | BODY MASS INDEX: 30.92 KG/M2 | WEIGHT: 197 LBS | SYSTOLIC BLOOD PRESSURE: 124 MMHG

## 2023-09-29 DIAGNOSIS — Z09 POSTOP CHECK: Primary | ICD-10-CM

## 2023-09-29 PROCEDURE — 99024 POSTOP FOLLOW-UP VISIT: CPT | Performed by: SURGERY

## 2023-09-29 NOTE — TELEPHONE ENCOUNTER
Spoke with patient by phone this afternoon, he notes that over the course of the last 2-3 days, he has started to develop coldness around the incisional site from his prior cyst removal. Notes that symptoms started initially after he was out of state visiting friends/family when he came into contact with sick child. He went to the gym later that day or the following day to do cardio workout (walking, jogging) followed by samaría. He notes that since then, he has developed some fatigue. Denies fever or chills or other systemic symptoms. He is scheduled to see Dr. Damari Rogers tomorrow for a post-op follow. Advised patient that given symptoms are only localized to the neck region similar to where incision was made, to follow up with GS on the matter. Advised patient to avoid strenuous activity for the next 3-4 days, walking at the gym only until then. Told patient that if symptoms significantly worsen over the weekend that he could seek care at Urgent care vs. ED. If symptoms persisted into next week, advised patient to schedule appt with myself or other member of our office for further evaluation of coldness around the neck region.

## 2023-10-04 ENCOUNTER — NURSE ONLY (OUTPATIENT)
Dept: PRIMARY CARE CLINIC | Age: 28
End: 2023-10-04
Payer: OTHER GOVERNMENT

## 2023-10-04 VITALS — TEMPERATURE: 97.8 F | HEIGHT: 67 IN | BODY MASS INDEX: 30.92 KG/M2 | RESPIRATION RATE: 16 BRPM | WEIGHT: 197 LBS

## 2023-10-04 DIAGNOSIS — Z23 NEED FOR INFLUENZA VACCINATION: Primary | ICD-10-CM

## 2023-10-04 PROCEDURE — 90674 CCIIV4 VAC NO PRSV 0.5 ML IM: CPT | Performed by: FAMILY MEDICINE

## 2023-10-04 PROCEDURE — 90471 IMMUNIZATION ADMIN: CPT | Performed by: FAMILY MEDICINE

## 2023-11-09 ENCOUNTER — OFFICE VISIT (OUTPATIENT)
Dept: PRIMARY CARE CLINIC | Age: 28
End: 2023-11-09
Payer: OTHER GOVERNMENT

## 2023-11-09 ENCOUNTER — HOSPITAL ENCOUNTER (OUTPATIENT)
Age: 28
Discharge: HOME | End: 2023-11-09
Payer: OTHER GOVERNMENT

## 2023-11-09 VITALS
RESPIRATION RATE: 18 BRPM | HEART RATE: 63 BPM | OXYGEN SATURATION: 98 % | HEIGHT: 67 IN | TEMPERATURE: 97.3 F | SYSTOLIC BLOOD PRESSURE: 116 MMHG | WEIGHT: 194.6 LBS | DIASTOLIC BLOOD PRESSURE: 68 MMHG | BODY MASS INDEX: 30.54 KG/M2

## 2023-11-09 DIAGNOSIS — R20.0 NUMBNESS AND TINGLING OF BOTH UPPER EXTREMITIES: ICD-10-CM

## 2023-11-09 DIAGNOSIS — R20.0 NUMBNESS AND TINGLING OF BOTH UPPER EXTREMITIES: Primary | ICD-10-CM

## 2023-11-09 DIAGNOSIS — R20.2 NUMBNESS AND TINGLING OF BOTH UPPER EXTREMITIES: Primary | ICD-10-CM

## 2023-11-09 DIAGNOSIS — R20.2 NUMBNESS AND TINGLING OF BOTH UPPER EXTREMITIES: ICD-10-CM

## 2023-11-09 LAB
FOLATE SERPL-MCNC: 9.81 NG/ML (ref 4.78–24.2)
VIT B12 SERPL-MCNC: 1600 PG/ML (ref 211–911)

## 2023-11-09 PROCEDURE — 82607 VITAMIN B-12: CPT

## 2023-11-09 PROCEDURE — 36415 COLL VENOUS BLD VENIPUNCTURE: CPT

## 2023-11-09 PROCEDURE — 99214 OFFICE O/P EST MOD 30 MIN: CPT | Performed by: STUDENT IN AN ORGANIZED HEALTH CARE EDUCATION/TRAINING PROGRAM

## 2023-11-09 PROCEDURE — 82746 ASSAY OF FOLIC ACID SERUM: CPT

## 2023-11-09 ASSESSMENT — PATIENT HEALTH QUESTIONNAIRE - PHQ9
SUM OF ALL RESPONSES TO PHQ QUESTIONS 1-9: 0
9. THOUGHTS THAT YOU WOULD BE BETTER OFF DEAD, OR OF HURTING YOURSELF: 0
SUM OF ALL RESPONSES TO PHQ QUESTIONS 1-9: 0
2. FEELING DOWN, DEPRESSED OR HOPELESS: 0
3. TROUBLE FALLING OR STAYING ASLEEP: 0
8. MOVING OR SPEAKING SO SLOWLY THAT OTHER PEOPLE COULD HAVE NOTICED. OR THE OPPOSITE, BEING SO FIGETY OR RESTLESS THAT YOU HAVE BEEN MOVING AROUND A LOT MORE THAN USUAL: 0
4. FEELING TIRED OR HAVING LITTLE ENERGY: 0
SUM OF ALL RESPONSES TO PHQ QUESTIONS 1-9: 0
SUM OF ALL RESPONSES TO PHQ9 QUESTIONS 1 & 2: 0
7. TROUBLE CONCENTRATING ON THINGS, SUCH AS READING THE NEWSPAPER OR WATCHING TELEVISION: 0
1. LITTLE INTEREST OR PLEASURE IN DOING THINGS: 0
5. POOR APPETITE OR OVEREATING: 0
SUM OF ALL RESPONSES TO PHQ QUESTIONS 1-9: 0
6. FEELING BAD ABOUT YOURSELF - OR THAT YOU ARE A FAILURE OR HAVE LET YOURSELF OR YOUR FAMILY DOWN: 0

## 2023-11-09 ASSESSMENT — ANXIETY QUESTIONNAIRES
IF YOU CHECKED OFF ANY PROBLEMS ON THIS QUESTIONNAIRE, HOW DIFFICULT HAVE THESE PROBLEMS MADE IT FOR YOU TO DO YOUR WORK, TAKE CARE OF THINGS AT HOME, OR GET ALONG WITH OTHER PEOPLE: NOT DIFFICULT AT ALL
1. FEELING NERVOUS, ANXIOUS, OR ON EDGE: 0
2. NOT BEING ABLE TO STOP OR CONTROL WORRYING: 1
3. WORRYING TOO MUCH ABOUT DIFFERENT THINGS: 0
5. BEING SO RESTLESS THAT IT IS HARD TO SIT STILL: 0
7. FEELING AFRAID AS IF SOMETHING AWFUL MIGHT HAPPEN: 1
6. BECOMING EASILY ANNOYED OR IRRITABLE: 0
4. TROUBLE RELAXING: 1
GAD7 TOTAL SCORE: 3

## 2023-11-09 ASSESSMENT — ENCOUNTER SYMPTOMS
WHEEZING: 0
ABDOMINAL PAIN: 0
CHEST TIGHTNESS: 0
CONSTIPATION: 0
BACK PAIN: 0
VOMITING: 0
NAUSEA: 0
COUGH: 0
DIARRHEA: 0
SHORTNESS OF BREATH: 0

## 2023-11-09 NOTE — PROGRESS NOTES
[address] [facility] and [facility] Medicine Residency Practice                                  [address] [place] [address], [address], [address] [address]         Phone: [phone]    Date of Service:  11/9/2023     Patient ID: Shawna Gilmore is a 25 y.o. male      Subjective:     CC: Bilateral UE Numbness/Tingling    HPI  Patient is a 78-year-old male with no significant past medical history aside from a previously excised sebaceous cyst over the neck. He presents in office today to discuss recurrent bilateral upper extremity numbness and tingling. Bilateral UE Numbness/Tingling  Since last time I saw the patient in office, and he has had progressively worsening bilateral upper extremity numbness and tingling with a burning sensation localized over the left lateral deltoid region for about 1 month. He was previously treated with once daily meloxicam 7.5 mg over the course of 2 weeks, at which time, completely resolved his symptoms. Patient states that he remained asymptomatic for several weeks before going back to the gym again where his symptoms started to recur after workouts. The pain along with numbness/tingling persisted even after his workouts when he was walking, watching television or performing mundane tasks. He notes that he has cut back significantly on his weightlifting but continues to do cardio workouts. The patient notes that symptoms occur daily. He notes intermittent numbness/tingling in the left lower extremity but notes that it is not nearly to the same degree as the upper extremities. ROS:  Review of Systems   Constitutional:  Negative for chills, diaphoresis, fatigue and fever. Respiratory:  Negative for cough, chest tightness, shortness of breath and wheezing. Cardiovascular:  Negative for chest pain, palpitations and leg swelling. Gastrointestinal:  Negative for abdominal pain, constipation, diarrhea, nausea and vomiting.

## 2024-03-15 ASSESSMENT — PATIENT HEALTH QUESTIONNAIRE - PHQ9
SUM OF ALL RESPONSES TO PHQ QUESTIONS 1-9: 0
SUM OF ALL RESPONSES TO PHQ QUESTIONS 1-9: 0
1. LITTLE INTEREST OR PLEASURE IN DOING THINGS: NOT AT ALL
1. LITTLE INTEREST OR PLEASURE IN DOING THINGS: NOT AT ALL
SUM OF ALL RESPONSES TO PHQ9 QUESTIONS 1 & 2: 0
SUM OF ALL RESPONSES TO PHQ QUESTIONS 1-9: 0
2. FEELING DOWN, DEPRESSED OR HOPELESS: NOT AT ALL
SUM OF ALL RESPONSES TO PHQ QUESTIONS 1-9: 0
2. FEELING DOWN, DEPRESSED OR HOPELESS: NOT AT ALL
SUM OF ALL RESPONSES TO PHQ9 QUESTIONS 1 & 2: 0

## 2024-03-18 ENCOUNTER — TELEPHONE (OUTPATIENT)
Dept: PRIMARY CARE CLINIC | Age: 29
End: 2024-03-18

## 2024-03-18 ENCOUNTER — OFFICE VISIT (OUTPATIENT)
Dept: PRIMARY CARE CLINIC | Age: 29
End: 2024-03-18
Payer: OTHER GOVERNMENT

## 2024-03-18 VITALS
DIASTOLIC BLOOD PRESSURE: 64 MMHG | SYSTOLIC BLOOD PRESSURE: 136 MMHG | WEIGHT: 191 LBS | BODY MASS INDEX: 29.91 KG/M2 | HEART RATE: 61 BPM | TEMPERATURE: 98.3 F | OXYGEN SATURATION: 98 %

## 2024-03-18 DIAGNOSIS — R20.2 NUMBNESS AND TINGLING OF BOTH UPPER EXTREMITIES: Primary | ICD-10-CM

## 2024-03-18 DIAGNOSIS — R20.0 NUMBNESS AND TINGLING OF BOTH UPPER EXTREMITIES: Primary | ICD-10-CM

## 2024-03-18 PROCEDURE — 99214 OFFICE O/P EST MOD 30 MIN: CPT | Performed by: STUDENT IN AN ORGANIZED HEALTH CARE EDUCATION/TRAINING PROGRAM

## 2024-03-18 NOTE — TELEPHONE ENCOUNTER
EMG order has been successfully faxed to [facility] as requested. Patient has been informed.

## 2024-03-18 NOTE — PROGRESS NOTES
PROGRESS NOTE   [facility]                                  [address], [address], [facility] [address]         Phone: [phone]    Date of Service:  3/18/2024     Patient ID: Stefania Shetty is a 28 y.o. male      Subjective:     CC: tingling in the back and arms    HPI  Patient is a 28-year-old male with no significant past medical history aside from a previously excised lipoma over the neck.  He presents in office today for follow-up for recurrent bilateral upper extremity numbness and tingling since August of 2023.     Bilateral UE Numbness/Tingling  Patient notes continued burning and tingling sensation in the upper extremities, most prominent on the left side of the body over the deltoid. The pain is exacerbated with certain lifting movements, but also occurs randomly. He was prescribed a course of Meloxicam in the past, which helped alleviate his symptoms during a period when they were especially severe, but they later recurred. He has also significantly modified his weight-lifting routine and is no longer doing back squats or exercises during which he has a barbell on his back. At last appointment, patient was referred for a c-spine x-ray; however, patient was initially unable to reach the provided contact and was subsequently deployed to [place], so he never completed the imaging. Denies any changes in strength or sensation.        ROS:  Sometimes notes that his heart feels heavy. No SOB, N/V, fevers or chills, changes in BM, back pain other than irregular sensation described in the HPI.        Vitals:    03/18/24 1305   BP: 136/64   Site: Left Upper Arm   Position: Sitting   Cuff Size: Medium Adult   Pulse: 61   Temp: 98.3 °F (36.8 °C)   TempSrc: Temporal   SpO2: 98%   Weight: 86.6 kg (191 lb)       Allergies:  Patient has no known allergies.      No outpatient medications have been marked as taking for the 3/18/24 encounter

## 2024-03-18 NOTE — TELEPHONE ENCOUNTER
----- Message from Josseline Malhotra sent at 3/18/2024  1:59 PM EDT -----  Regarding: ECC Message to Provider  ECC Message to Provider    Relationship to Patient: Self     Additional Information Patient requested for the practice to fax over the order to the neurologist where Dr. De Los Santos refer him.  Fax: [phone]  --------------------------------------------------------------------------------------------------------------------------    Call Back Information: OK to leave message on voicemail  Preferred Call Back Number: Phone [phone]

## 2024-03-20 NOTE — TELEPHONE ENCOUNTER
Received fax from [facility] with request for more more information. In order to complete the EMG [facility] needs \"Exact diagnosis, exact testing needing done, stating which body parts specifically need tested, and all pt demographic info.\" I have placed the papers in your folder.

## 2024-03-21 NOTE — TELEPHONE ENCOUNTER
Diagnosis: Bilateral Upper Extremity Numbness/Tingling  Testing and Body Parts: Bilateral UE EMG    Patient information and insurance info can be sent in fax. This information was already sent to them based on the paperwork in my folder, though. Please call and advise referring office of this and re-fax the information to them, if needed. Thank you.

## 2024-03-22 ENCOUNTER — TELEPHONE (OUTPATIENT)
Dept: PRIMARY CARE CLINIC | Age: 29
End: 2024-03-22

## 2024-03-22 ENCOUNTER — HOSPITAL ENCOUNTER (OUTPATIENT)
Dept: MRI IMAGING | Age: 29
Discharge: HOME | End: 2024-03-22
Payer: OTHER GOVERNMENT

## 2024-03-22 DIAGNOSIS — R20.0 NUMBNESS AND TINGLING OF BOTH UPPER EXTREMITIES: ICD-10-CM

## 2024-03-22 DIAGNOSIS — R20.2 NUMBNESS AND TINGLING OF BOTH UPPER EXTREMITIES: ICD-10-CM

## 2024-03-22 PROCEDURE — 72141 MRI NECK SPINE W/O DYE: CPT

## 2024-03-22 NOTE — TELEPHONE ENCOUNTER
----- Message from Nuvia Wu-FANNY Sky sent at 3/22/2024  3:18 PM EDT -----  Subject: Message to Provider    QUESTIONS  Information for Provider? Needs new order for EMG Please be specific which   body part. Fax to [phone] Please call patient when complete.   ---------------------------------------------------------------------------  --------------  CALL BACK INFO  [phone]; OK to leave message on voicemail  ---------------------------------------------------------------------------  --------------  SCRIPT ANSWERS  Relationship to Patient? Self

## 2024-03-25 ENCOUNTER — TELEPHONE (OUTPATIENT)
Dept: PRIMARY CARE CLINIC | Age: 29
End: 2024-03-25

## 2024-03-25 RX ORDER — METHYLPREDNISOLONE 4 MG/1
TABLET ORAL
Qty: 1 KIT | Refills: 0 | Status: SHIPPED | OUTPATIENT
Start: 2024-03-25 | End: 2024-03-27

## 2024-03-25 RX ORDER — MELOXICAM 7.5 MG/1
7.5 TABLET ORAL DAILY PRN
Qty: 30 TABLET | Refills: 0 | Status: SHIPPED | OUTPATIENT
Start: 2024-03-25 | End: 2024-03-27

## 2024-03-25 NOTE — TELEPHONE ENCOUNTER
----- Message from Brenna Romel sent at 3/25/2024 10:10 AM EDT -----  Subject: Message to Provider    QUESTIONS  Information for Provider? patient called to check status of refaxing of   EMG order specifying which part of the body to test, please contact   patient to advise.   ---------------------------------------------------------------------------  --------------  CALL BACK INFO  [phone]; OK to leave message on voicemail  ---------------------------------------------------------------------------  --------------  SCRIPT ANSWERS  Relationship to Patient? Self

## 2024-03-27 ENCOUNTER — TELEPHONE (OUTPATIENT)
Dept: PRIMARY CARE CLINIC | Age: 29
End: 2024-03-27

## 2024-03-27 DIAGNOSIS — R20.2 NUMBNESS AND TINGLING OF BOTH UPPER EXTREMITIES: Primary | ICD-10-CM

## 2024-03-27 DIAGNOSIS — R73.03 PREDIABETES: Primary | ICD-10-CM

## 2024-03-27 DIAGNOSIS — R20.0 NUMBNESS AND TINGLING OF BOTH UPPER EXTREMITIES: Primary | ICD-10-CM

## 2024-03-27 RX ORDER — MELOXICAM 7.5 MG/1
7.5 TABLET ORAL DAILY PRN
Qty: 30 TABLET | Refills: 0 | Status: SHIPPED | OUTPATIENT
Start: 2024-03-27

## 2024-03-27 RX ORDER — METHYLPREDNISOLONE 4 MG/1
TABLET ORAL
Qty: 1 KIT | Refills: 0 | Status: SHIPPED | OUTPATIENT
Start: 2024-03-27 | End: 2024-04-02

## 2024-03-27 NOTE — TELEPHONE ENCOUNTER
----- Message from Aga Ruffin sent at 3/27/2024 10:17 AM EDT -----  Subject: Medication Problem     Medication: methylPREDNISolone (MEDROL DOSEPACK) 4 MG tablet  Dosage: unknown  Ordering Provider:     Question/Problem: patient calling stating this medication was sent to the   pharmacy but he needs to send it to a different pharmacy if possible. the   pharmacy he would like it sent to is [facility] [address] [address] - ph: [phone]      Pharmacy: [facility] - [place] [address] [address] [phone] - F [phone]    ---------------------------------------------------------------------------  --------------  CALL BACK INFO  [phone]; OK to leave message on voicemail  ---------------------------------------------------------------------------  --------------    SCRIPT ANSWERS  Relationship to Patient: Self

## 2024-03-28 DIAGNOSIS — R07.9 CHEST PAIN, UNSPECIFIED TYPE: Primary | ICD-10-CM

## 2024-03-29 ENCOUNTER — TELEPHONE (OUTPATIENT)
Dept: PRIMARY CARE CLINIC | Age: 29
End: 2024-03-29

## 2024-03-29 NOTE — TELEPHONE ENCOUNTER
Patient is calling and stating that he thinks that he is having and reaction to prednisone and he has no taste buds and his tongue is yellow and is got an icy hot feeling. He stated that he has brushed his teeth and tongue twice and it is not going away. Talked to Dr. Ramos and she said that it was probably not a reaction from the medication and that he could stop taking it and if it does not get any better to come see us. Patient was advised of Drs message.

## 2024-04-11 ENCOUNTER — TELEPHONE (OUTPATIENT)
Dept: PRIMARY CARE CLINIC | Age: 29
End: 2024-04-11

## 2024-04-11 NOTE — TELEPHONE ENCOUNTER
----- Message from Johannaruben Ronald sent at 4/11/2024 11:29 AM EDT -----  Regarding: ECC Results Request  ECC Results Request    Which lab or imaging result is the patient calling about: EMG    Which provider ordered the test? Joey De Los Santos    Was this a Non-[facility] Provider: No    Date the test was preformed (MM/DD/YYYY): [id number]  --------------------------------------------------------------------------------------------------------------------------    Relationship to Patient: Self     Call Back Info: OK to leave message on voicemail  Preferred Call Back Number: Phone [phone]

## 2024-04-23 ENCOUNTER — HOSPITAL ENCOUNTER (OUTPATIENT)
Dept: NON INVASIVE DIAGNOSTICS | Age: 29
Discharge: HOME | End: 2024-04-23
Payer: OTHER GOVERNMENT

## 2024-04-23 DIAGNOSIS — R07.9 CHEST PAIN, UNSPECIFIED TYPE: ICD-10-CM

## 2024-04-23 PROCEDURE — 93306 TTE W/DOPPLER COMPLETE: CPT

## 2024-04-30 ENCOUNTER — OFFICE VISIT (OUTPATIENT)
Dept: PRIMARY CARE CLINIC | Age: 29
End: 2024-04-30
Payer: OTHER GOVERNMENT

## 2024-04-30 VITALS
OXYGEN SATURATION: 98 % | SYSTOLIC BLOOD PRESSURE: 112 MMHG | DIASTOLIC BLOOD PRESSURE: 76 MMHG | WEIGHT: 193.2 LBS | BODY MASS INDEX: 30.26 KG/M2 | TEMPERATURE: 98.1 F | HEART RATE: 69 BPM

## 2024-04-30 DIAGNOSIS — G89.29 CHRONIC LEFT-SIDED LOW BACK PAIN, UNSPECIFIED WHETHER SCIATICA PRESENT: ICD-10-CM

## 2024-04-30 DIAGNOSIS — M54.50 CHRONIC LEFT-SIDED LOW BACK PAIN, UNSPECIFIED WHETHER SCIATICA PRESENT: ICD-10-CM

## 2024-04-30 DIAGNOSIS — R20.0 ARM NUMBNESS: Primary | ICD-10-CM

## 2024-04-30 PROCEDURE — 99214 OFFICE O/P EST MOD 30 MIN: CPT | Performed by: STUDENT IN AN ORGANIZED HEALTH CARE EDUCATION/TRAINING PROGRAM

## 2024-04-30 ASSESSMENT — ENCOUNTER SYMPTOMS
CHEST TIGHTNESS: 0
ABDOMINAL PAIN: 0
COUGH: 0
NAUSEA: 0
DIARRHEA: 0
SHORTNESS OF BREATH: 0
BACK PAIN: 1
CONSTIPATION: 0
VOMITING: 0
WHEEZING: 0

## 2024-04-30 NOTE — PROGRESS NOTES
PROGRESS NOTE   [facility]                                  [address], [address], [facility] [address]         Phone: [phone]    Date of Service:  4/30/2024     Patient ID: Stefania Shetty is a 29 y.o. male      Subjective:     CC: Follow Up on Arm Numbness/Tingling    HPI  Patient is a 28-year-old male with no significant past medical history aside from a previously excised lipoma over the neck.  He presents in office today for follow-up for recurrent bilateral upper extremity numbness and tingling, left worse than right.    Interval History:   Since last time I saw the patient, I prescribed him meloxicam 7.5 mg daily as needed in addition to Medrol Dosepak which he notes provided significant improvement to this his symptoms, almost complete resolution at this time.  He did obtain an echo which was unremarkable.  EMG did show mild bilateral carpal tunnel syndrome with potential left cubital tunnel syndrome.  He previously obtained an MRI of the cervical spine as well.  Today, he notes that symptoms of left lateral deltoid numbness/tingling are flared up during and after working out.  However, he notes that symptoms are significantly improved compared to last visit.  He does have mild residual symptoms that are still persistent.  Additionally, he has posterior lateral thigh pain that continues to be present as well.      ROS:  Review of Systems   Constitutional:  Negative for chills, diaphoresis, fatigue and fever.   Respiratory:  Negative for cough, chest tightness, shortness of breath and wheezing.    Cardiovascular:  Negative for chest pain, palpitations and leg swelling.   Gastrointestinal:  Negative for abdominal pain, constipation, diarrhea, nausea and vomiting.   Musculoskeletal:  Positive for back pain and neck pain. Negative for arthralgias, joint swelling, myalgias and neck stiffness.   Neurological:  Positive for

## 2025-05-25 SDOH — ECONOMIC STABILITY: INCOME INSECURITY: IN THE LAST 12 MONTHS, WAS THERE A TIME WHEN YOU WERE NOT ABLE TO PAY THE MORTGAGE OR RENT ON TIME?: NO

## 2025-05-25 SDOH — ECONOMIC STABILITY: FOOD INSECURITY: WITHIN THE PAST 12 MONTHS, YOU WORRIED THAT YOUR FOOD WOULD RUN OUT BEFORE YOU GOT MONEY TO BUY MORE.: NEVER TRUE

## 2025-05-25 SDOH — ECONOMIC STABILITY: FOOD INSECURITY: WITHIN THE PAST 12 MONTHS, THE FOOD YOU BOUGHT JUST DIDN'T LAST AND YOU DIDN'T HAVE MONEY TO GET MORE.: NEVER TRUE

## 2025-05-25 SDOH — ECONOMIC STABILITY: TRANSPORTATION INSECURITY
IN THE PAST 12 MONTHS, HAS THE LACK OF TRANSPORTATION KEPT YOU FROM MEDICAL APPOINTMENTS OR FROM GETTING MEDICATIONS?: NO

## 2025-05-25 SDOH — ECONOMIC STABILITY: TRANSPORTATION INSECURITY
IN THE PAST 12 MONTHS, HAS LACK OF TRANSPORTATION KEPT YOU FROM MEETINGS, WORK, OR FROM GETTING THINGS NEEDED FOR DAILY LIVING?: NO

## 2025-05-25 ASSESSMENT — PATIENT HEALTH QUESTIONNAIRE - PHQ9
SUM OF ALL RESPONSES TO PHQ QUESTIONS 1-9: 0
2. FEELING DOWN, DEPRESSED OR HOPELESS: NOT AT ALL
SUM OF ALL RESPONSES TO PHQ9 QUESTIONS 1 & 2: 0
1. LITTLE INTEREST OR PLEASURE IN DOING THINGS: NOT AT ALL
2. FEELING DOWN, DEPRESSED OR HOPELESS: NOT AT ALL
SUM OF ALL RESPONSES TO PHQ QUESTIONS 1-9: 0
1. LITTLE INTEREST OR PLEASURE IN DOING THINGS: NOT AT ALL

## 2025-05-27 ENCOUNTER — RESULTS FOLLOW-UP (OUTPATIENT)
Dept: PRIMARY CARE CLINIC | Age: 30
End: 2025-05-27

## 2025-05-27 ENCOUNTER — HOSPITAL ENCOUNTER (OUTPATIENT)
Dept: GENERAL RADIOLOGY | Age: 30
Discharge: HOME | End: 2025-05-27
Payer: OTHER GOVERNMENT

## 2025-05-27 ENCOUNTER — OFFICE VISIT (OUTPATIENT)
Dept: PRIMARY CARE CLINIC | Age: 30
End: 2025-05-27
Payer: OTHER GOVERNMENT

## 2025-05-27 VITALS
HEIGHT: 67 IN | WEIGHT: 182 LBS | OXYGEN SATURATION: 98 % | BODY MASS INDEX: 28.56 KG/M2 | HEART RATE: 76 BPM | DIASTOLIC BLOOD PRESSURE: 60 MMHG | SYSTOLIC BLOOD PRESSURE: 100 MMHG

## 2025-05-27 DIAGNOSIS — R10.9 FLANK PAIN: ICD-10-CM

## 2025-05-27 DIAGNOSIS — G89.29 CHRONIC RIGHT-SIDED THORACIC BACK PAIN: ICD-10-CM

## 2025-05-27 DIAGNOSIS — G89.29 CHRONIC RIGHT-SIDED THORACIC BACK PAIN: Primary | ICD-10-CM

## 2025-05-27 DIAGNOSIS — M54.6 CHRONIC RIGHT-SIDED THORACIC BACK PAIN: Primary | ICD-10-CM

## 2025-05-27 DIAGNOSIS — M54.6 CHRONIC RIGHT-SIDED THORACIC BACK PAIN: ICD-10-CM

## 2025-05-27 LAB
BILIRUBIN, POC: NORMAL
BLOOD URINE, POC: NORMAL
CLARITY, POC: CLEAR
COLOR, POC: YELLOW
GLUCOSE URINE, POC: NORMAL MG/DL
KETONES, POC: NORMAL MG/DL
LEUKOCYTE EST, POC: NORMAL
NITRITE, POC: NORMAL
PH, POC: 7
PROTEIN, POC: NORMAL MG/DL
SPECIFIC GRAVITY, POC: 1.01
UROBILINOGEN, POC: 0.2 MG/DL

## 2025-05-27 PROCEDURE — 81002 URINALYSIS NONAUTO W/O SCOPE: CPT | Performed by: STUDENT IN AN ORGANIZED HEALTH CARE EDUCATION/TRAINING PROGRAM

## 2025-05-27 PROCEDURE — 72070 X-RAY EXAM THORAC SPINE 2VWS: CPT

## 2025-05-27 PROCEDURE — 72100 X-RAY EXAM L-S SPINE 2/3 VWS: CPT

## 2025-05-27 PROCEDURE — 99214 OFFICE O/P EST MOD 30 MIN: CPT | Performed by: STUDENT IN AN ORGANIZED HEALTH CARE EDUCATION/TRAINING PROGRAM

## 2025-05-27 RX ORDER — MELOXICAM 7.5 MG/1
7.5 TABLET ORAL DAILY PRN
Qty: 30 TABLET | Refills: 0 | Status: SHIPPED | OUTPATIENT
Start: 2025-05-27

## 2025-05-27 SDOH — ECONOMIC STABILITY: FOOD INSECURITY: WITHIN THE PAST 12 MONTHS, YOU WORRIED THAT YOUR FOOD WOULD RUN OUT BEFORE YOU GOT MONEY TO BUY MORE.: NEVER TRUE

## 2025-05-27 SDOH — ECONOMIC STABILITY: FOOD INSECURITY: WITHIN THE PAST 12 MONTHS, THE FOOD YOU BOUGHT JUST DIDN'T LAST AND YOU DIDN'T HAVE MONEY TO GET MORE.: NEVER TRUE

## 2025-05-27 ASSESSMENT — ENCOUNTER SYMPTOMS
WHEEZING: 0
ABDOMINAL PAIN: 0
SHORTNESS OF BREATH: 0
VOMITING: 0
EYES NEGATIVE: 1
ALLERGIC/IMMUNOLOGIC NEGATIVE: 1
CHEST TIGHTNESS: 0
DIARRHEA: 0
NAUSEA: 0
ABDOMINAL DISTENTION: 0
BACK PAIN: 1

## 2025-05-27 NOTE — PROGRESS NOTES
PROGRESS NOTE   [facility]                                  [address], [address], [facility] [address]         Phone: [phone]    Date of Service:  5/27/2025     Patient ID: Stefania Shetty is a 30 y.o. male    Subjective:     CC: Back pain     HPI:  Ezequiel is a 30 y.o. male with a past medical history of upper extremity numbness and tingling who presents to the clinic today with a 2 week history of worsening right lower back pain. Began several months ago but was duller in nature. Pain is sharp in nature, beginning in mid to low back and radiates to the right side. Pain is intermittent, typically severe (8/10) in intensity, and is exacerbated by nothing in particular, denies worsening with food intake. Has tried multiple alleviating factors, such as adjusting positions, Tylenol, Advil, prescribed Tramadol, and finds that none help. Associated symptoms: mild fatigue. Denies numbness and tingling in extremities.  Patient reports the following CPR Red Flags: none.  Precipitating factors: no known injury. Diagnostic testing: Was seen at urgent care one week ago and completed blood work and an abdominal ultrasound (both unremarkable) Prescribed Tramadol. Patient's history includes recurrent self limited episodes of low back pain in the past.        ROS:  Review of Systems   Constitutional:  Negative for activity change, appetite change, fever and unexpected weight change.   HENT: Negative.     Eyes: Negative.    Respiratory:  Negative for chest tightness, shortness of breath and wheezing.    Cardiovascular:  Negative for chest pain, palpitations and leg swelling.   Gastrointestinal:  Negative for abdominal distention, abdominal pain, diarrhea, nausea and vomiting.   Endocrine: Negative for polyuria.   Genitourinary:  Negative for decreased urine volume, difficulty urinating, dysuria, flank pain, hematuria and urgency.   Musculoskeletal:

## 2025-05-28 ASSESSMENT — ENCOUNTER SYMPTOMS
ALLERGIC/IMMUNOLOGIC NEGATIVE: 1
ABDOMINAL DISTENTION: 0
NAUSEA: 0
DIARRHEA: 0
VOMITING: 0
CHEST TIGHTNESS: 0
EYES NEGATIVE: 1
SHORTNESS OF BREATH: 0
ABDOMINAL PAIN: 0
BACK PAIN: 1
WHEEZING: 0

## 2025-05-28 NOTE — PROGRESS NOTES
PROGRESS NOTE   [facility]                                  [address], [address], [facility] [address]         Phone: [phone]    Date of Service:  5/27/2025     Patient ID: Stefania Shetty is a 30 y.o. male    Subjective:     CC: Back Pain     HPI:    Ezequiel is a 30 y.o. male with a past medical history of upper extremity numbness and tingling (resolved) who presents to the clinic today with a 2 week history of worsening right lower back pain. Began several months ago (almost 1 year ago) but was more dull in nature. Pain is now sharp in nature, beginning in mid to low back and radiates to the right side. Pain is intermittent, typically severe (8/10) in intensity, and is exacerbated by nothing in particular, denies worsening with food intake. Has tried multiple alleviating factors, such as adjusting positions, Tylenol, Advil, prescribed Tramadol, and finds that nothing help. Denies numbness and tingling in extremities.  Precipitating factors: no known injury. Diagnostic testing: Was seen at urgent care one week ago and completed blood work and an abdominal/renal ultrasound (both unremarkable) Prescribed Tramadol. Patient's history includes recurrent self limited episodes of low back pain in the past.        ROS:  Review of Systems   Constitutional:  Negative for activity change, appetite change, fever and unexpected weight change.   HENT: Negative.     Eyes: Negative.    Respiratory:  Negative for chest tightness, shortness of breath and wheezing.    Cardiovascular:  Negative for chest pain, palpitations and leg swelling.   Gastrointestinal:  Negative for abdominal distention, abdominal pain, diarrhea, nausea and vomiting.   Endocrine: Negative for polyuria.   Genitourinary:  Negative for decreased urine volume, difficulty urinating, dysuria, flank pain, hematuria and urgency.   Musculoskeletal:  Positive for back pain. Negative for

## 2025-05-29 ENCOUNTER — RESULTS FOLLOW-UP (OUTPATIENT)
Dept: PRIMARY CARE CLINIC | Age: 30
End: 2025-05-29

## 2025-06-25 RX ORDER — MELOXICAM 7.5 MG/1
7.5 TABLET ORAL DAILY PRN
Qty: 30 TABLET | Refills: 0 | Status: SHIPPED | OUTPATIENT
Start: 2025-06-25

## 2025-06-25 NOTE — TELEPHONE ENCOUNTER
Refill Request       Last Seen: Last Seen Department: 5/27/2025  Last Seen by PCP: 5/27/2025    Last Written: 5/27/25 30 with 0    Next Appointment:   No future appointments.      Requested Prescriptions     Pending Prescriptions Disp Refills    meloxicam (MOBIC) 7.5 MG tablet [Pharmacy Med Name: MELOXICAM 7.5 MG TABLET] 30 tablet 0     Sig: TAKE 1 TABLET BY MOUTH EVERY DAY AS NEEDED FOR PAIN

## (undated) DEVICE — SUTURE NONABSORBABLE MONOFILAMENT 3-0 PS-1 18 IN BLK ETHILON 1663H

## (undated) DEVICE — TUBING, SUCTION, 3/16" X 12', STRAIGHT: Brand: MEDLINE

## (undated) DEVICE — TUBING SUCT 10FR MAL ALUM SHFT FN CAP VENT UNIV CONN W/ OBT

## (undated) DEVICE — SOLUTION IV IRRIG 500ML 0.9% SODIUM CHL 2F7123

## (undated) DEVICE — SURGICAL PROCEDURE PACK IV U-BAR

## (undated) DEVICE — SUTURE VCRL + SZ 3-0 L27IN ABSRB UD L26MM SH 1/2 CIR VCP416H

## (undated) DEVICE — GLOVE SURG SZ 75 L12IN FNGR THK94MIL STD WHT LTX FREE

## (undated) DEVICE — SUTURE VCRL + SZ 3-0 L18IN ABSRB UD SH 1/2 CIR TAPERCUT NDL VCP864D

## (undated) DEVICE — ELECTRODE PT RET AD L9FT HI MOIST COND ADH HYDRGEL CORDED

## (undated) DEVICE — GOWN,SIRUS,NON REINFRCD,LARGE,SET IN SL: Brand: MEDLINE

## (undated) DEVICE — MINOR SET UP PACK: Brand: MEDLINE INDUSTRIES, INC.